# Patient Record
Sex: MALE | Race: OTHER | NOT HISPANIC OR LATINO
[De-identification: names, ages, dates, MRNs, and addresses within clinical notes are randomized per-mention and may not be internally consistent; named-entity substitution may affect disease eponyms.]

---

## 2018-02-26 PROBLEM — Z00.00 ENCOUNTER FOR PREVENTIVE HEALTH EXAMINATION: Status: ACTIVE | Noted: 2018-02-26

## 2018-03-16 ENCOUNTER — APPOINTMENT (OUTPATIENT)
Dept: ORTHOPEDIC SURGERY | Facility: CLINIC | Age: 68
End: 2018-03-16
Payer: COMMERCIAL

## 2018-03-16 VITALS
SYSTOLIC BLOOD PRESSURE: 136 MMHG | HEART RATE: 64 BPM | DIASTOLIC BLOOD PRESSURE: 80 MMHG | HEIGHT: 66 IN | BODY MASS INDEX: 31.34 KG/M2 | WEIGHT: 195 LBS

## 2018-03-16 PROCEDURE — 73502 X-RAY EXAM HIP UNI 2-3 VIEWS: CPT

## 2018-03-16 PROCEDURE — 99203 OFFICE O/P NEW LOW 30 MIN: CPT

## 2018-07-24 ENCOUNTER — OTHER (OUTPATIENT)
Age: 68
End: 2018-07-24

## 2018-08-01 PROBLEM — Z78.9 RARELY CONSUMES ALCOHOL: Status: ACTIVE | Noted: 2018-03-16

## 2018-08-01 PROBLEM — Z87.891 FORMER SMOKER: Status: ACTIVE | Noted: 2018-03-16

## 2018-08-01 PROBLEM — M47.816 LUMBAR SPONDYLOSIS: Status: ACTIVE | Noted: 2018-08-01

## 2018-08-01 PROBLEM — Z78.9 DOES NOT USE ILLICIT DRUGS: Status: ACTIVE | Noted: 2018-03-16

## 2018-08-01 PROBLEM — Z80.9 FAMILY HISTORY OF MALIGNANT NEOPLASM: Status: ACTIVE | Noted: 2018-03-16

## 2018-08-01 PROBLEM — Z86.79 HISTORY OF HYPERTENSION: Status: RESOLVED | Noted: 2018-03-16 | Resolved: 2018-08-01

## 2018-08-01 PROBLEM — Z87.39 HISTORY OF ARTHRITIS: Status: RESOLVED | Noted: 2018-03-16 | Resolved: 2018-08-01

## 2018-08-01 PROBLEM — Z82.61 FAMILY HISTORY OF ARTHRITIS: Status: ACTIVE | Noted: 2018-03-16

## 2018-08-01 RX ORDER — AMLODIPINE BESYLATE 5 MG/1
TABLET ORAL
Refills: 0 | Status: ACTIVE | COMMUNITY

## 2018-08-03 ENCOUNTER — APPOINTMENT (OUTPATIENT)
Dept: ORTHOPEDIC SURGERY | Facility: CLINIC | Age: 68
End: 2018-08-03
Payer: COMMERCIAL

## 2018-08-03 VITALS
BODY MASS INDEX: 31.34 KG/M2 | WEIGHT: 195 LBS | SYSTOLIC BLOOD PRESSURE: 154 MMHG | HEIGHT: 66 IN | DIASTOLIC BLOOD PRESSURE: 72 MMHG | HEART RATE: 56 BPM

## 2018-08-03 DIAGNOSIS — Z80.9 FAMILY HISTORY OF MALIGNANT NEOPLASM, UNSPECIFIED: ICD-10-CM

## 2018-08-03 DIAGNOSIS — Z82.61 FAMILY HISTORY OF ARTHRITIS: ICD-10-CM

## 2018-08-03 DIAGNOSIS — Z86.79 PERSONAL HISTORY OF OTHER DISEASES OF THE CIRCULATORY SYSTEM: ICD-10-CM

## 2018-08-03 DIAGNOSIS — Z87.891 PERSONAL HISTORY OF NICOTINE DEPENDENCE: ICD-10-CM

## 2018-08-03 DIAGNOSIS — Z96.642 PRESENCE OF LEFT ARTIFICIAL HIP JOINT: ICD-10-CM

## 2018-08-03 DIAGNOSIS — Z87.39 PERSONAL HISTORY OF OTHER DISEASES OF THE MUSCULOSKELETAL SYSTEM AND CONNECTIVE TISSUE: ICD-10-CM

## 2018-08-03 DIAGNOSIS — M70.62 TROCHANTERIC BURSITIS, LEFT HIP: ICD-10-CM

## 2018-08-03 DIAGNOSIS — Z78.9 OTHER SPECIFIED HEALTH STATUS: ICD-10-CM

## 2018-08-03 DIAGNOSIS — M47.816 SPONDYLOSIS W/OUT MYELOPATHY OR RADICULOPATHY, LUMBAR REGION: ICD-10-CM

## 2018-08-03 PROCEDURE — 72100 X-RAY EXAM L-S SPINE 2/3 VWS: CPT

## 2018-08-03 PROCEDURE — 99214 OFFICE O/P EST MOD 30 MIN: CPT

## 2018-09-21 ENCOUNTER — APPOINTMENT (OUTPATIENT)
Dept: ORTHOPEDIC SURGERY | Facility: CLINIC | Age: 68
End: 2018-09-21

## 2019-01-16 ENCOUNTER — MESSAGE (OUTPATIENT)
Age: 69
End: 2019-01-16

## 2019-02-24 ENCOUNTER — EMERGENCY (EMERGENCY)
Facility: HOSPITAL | Age: 69
LOS: 1 days | Discharge: HOME | End: 2019-02-24
Attending: EMERGENCY MEDICINE
Payer: COMMERCIAL

## 2019-02-24 VITALS
DIASTOLIC BLOOD PRESSURE: 72 MMHG | HEART RATE: 64 BPM | SYSTOLIC BLOOD PRESSURE: 114 MMHG | OXYGEN SATURATION: 90 % | RESPIRATION RATE: 18 BRPM | TEMPERATURE: 99 F

## 2019-02-24 LAB
ALBUMIN SERPL ELPH-MCNC: 4.3 G/DL — SIGNIFICANT CHANGE UP (ref 3.3–5.2)
ALP SERPL-CCNC: 98 U/L — SIGNIFICANT CHANGE UP (ref 40–120)
ALT FLD-CCNC: 16 U/L — SIGNIFICANT CHANGE UP
ANION GAP SERPL CALC-SCNC: 14 MMOL/L — SIGNIFICANT CHANGE UP (ref 5–17)
APTT BLD: 25.5 SEC — LOW (ref 27.5–36.3)
AST SERPL-CCNC: 14 U/L — SIGNIFICANT CHANGE UP
BASOPHILS # BLD AUTO: 0 K/UL — SIGNIFICANT CHANGE UP (ref 0–0.2)
BASOPHILS NFR BLD AUTO: 0.2 % — SIGNIFICANT CHANGE UP (ref 0–2)
BILIRUB SERPL-MCNC: 0.6 MG/DL — SIGNIFICANT CHANGE UP (ref 0.4–2)
BUN SERPL-MCNC: 18 MG/DL — SIGNIFICANT CHANGE UP (ref 8–20)
CALCIUM SERPL-MCNC: 8.9 MG/DL — SIGNIFICANT CHANGE UP (ref 8.6–10.2)
CHLORIDE SERPL-SCNC: 105 MMOL/L — SIGNIFICANT CHANGE UP (ref 98–107)
CO2 SERPL-SCNC: 24 MMOL/L — SIGNIFICANT CHANGE UP (ref 22–29)
CREAT SERPL-MCNC: 0.99 MG/DL — SIGNIFICANT CHANGE UP (ref 0.5–1.3)
EOSINOPHIL # BLD AUTO: 0.1 K/UL — SIGNIFICANT CHANGE UP (ref 0–0.5)
EOSINOPHIL NFR BLD AUTO: 0.9 % — SIGNIFICANT CHANGE UP (ref 0–5)
GLUCOSE SERPL-MCNC: 162 MG/DL — HIGH (ref 70–115)
HCT VFR BLD CALC: 41.4 % — LOW (ref 42–52)
HGB BLD-MCNC: 13.2 G/DL — LOW (ref 14–18)
INR BLD: 1.21 RATIO — HIGH (ref 0.88–1.16)
LACTATE BLDV-MCNC: 1.7 MMOL/L — SIGNIFICANT CHANGE UP (ref 0.5–2)
LACTATE BLDV-MCNC: 2 MMOL/L — SIGNIFICANT CHANGE UP (ref 0.5–2)
LIDOCAIN IGE QN: 33 U/L — SIGNIFICANT CHANGE UP (ref 22–51)
LYMPHOCYTES # BLD AUTO: 0.7 K/UL — LOW (ref 1–4.8)
LYMPHOCYTES # BLD AUTO: 7.4 % — LOW (ref 20–55)
MAGNESIUM SERPL-MCNC: 1.6 MG/DL — SIGNIFICANT CHANGE UP (ref 1.6–2.6)
MCHC RBC-ENTMCNC: 27.4 PG — SIGNIFICANT CHANGE UP (ref 27–31)
MCHC RBC-ENTMCNC: 31.9 G/DL — LOW (ref 32–36)
MCV RBC AUTO: 86.1 FL — SIGNIFICANT CHANGE UP (ref 80–94)
MONOCYTES # BLD AUTO: 0.4 K/UL — SIGNIFICANT CHANGE UP (ref 0–0.8)
MONOCYTES NFR BLD AUTO: 4.1 % — SIGNIFICANT CHANGE UP (ref 3–10)
NEUTROPHILS # BLD AUTO: 8.5 K/UL — HIGH (ref 1.8–8)
NEUTROPHILS NFR BLD AUTO: 87.1 % — HIGH (ref 37–73)
NT-PROBNP SERPL-SCNC: 55 PG/ML — SIGNIFICANT CHANGE UP (ref 0–300)
PLATELET # BLD AUTO: 171 K/UL — SIGNIFICANT CHANGE UP (ref 150–400)
POTASSIUM SERPL-MCNC: 3.9 MMOL/L — SIGNIFICANT CHANGE UP (ref 3.5–5.3)
POTASSIUM SERPL-SCNC: 3.9 MMOL/L — SIGNIFICANT CHANGE UP (ref 3.5–5.3)
PROT SERPL-MCNC: 7 G/DL — SIGNIFICANT CHANGE UP (ref 6.6–8.7)
PROTHROM AB SERPL-ACNC: 14 SEC — HIGH (ref 10–12.9)
RBC # BLD: 4.81 M/UL — SIGNIFICANT CHANGE UP (ref 4.6–6.2)
RBC # FLD: 14.5 % — SIGNIFICANT CHANGE UP (ref 11–15.6)
SODIUM SERPL-SCNC: 143 MMOL/L — SIGNIFICANT CHANGE UP (ref 135–145)
TROPONIN T SERPL-MCNC: <0.01 NG/ML — SIGNIFICANT CHANGE UP (ref 0–0.06)
TROPONIN T SERPL-MCNC: <0.01 NG/ML — SIGNIFICANT CHANGE UP (ref 0–0.06)
WBC # BLD: 9.7 K/UL — SIGNIFICANT CHANGE UP (ref 4.8–10.8)
WBC # FLD AUTO: 9.7 K/UL — SIGNIFICANT CHANGE UP (ref 4.8–10.8)

## 2019-02-24 PROCEDURE — 83880 ASSAY OF NATRIURETIC PEPTIDE: CPT

## 2019-02-24 PROCEDURE — 71275 CT ANGIOGRAPHY CHEST: CPT | Mod: 26

## 2019-02-24 PROCEDURE — 85730 THROMBOPLASTIN TIME PARTIAL: CPT

## 2019-02-24 PROCEDURE — 99285 EMERGENCY DEPT VISIT HI MDM: CPT | Mod: 25

## 2019-02-24 PROCEDURE — 85610 PROTHROMBIN TIME: CPT

## 2019-02-24 PROCEDURE — 99285 EMERGENCY DEPT VISIT HI MDM: CPT

## 2019-02-24 PROCEDURE — 80053 COMPREHEN METABOLIC PANEL: CPT

## 2019-02-24 PROCEDURE — 71045 X-RAY EXAM CHEST 1 VIEW: CPT | Mod: 26

## 2019-02-24 PROCEDURE — 96375 TX/PRO/DX INJ NEW DRUG ADDON: CPT | Mod: XU

## 2019-02-24 PROCEDURE — 85027 COMPLETE CBC AUTOMATED: CPT

## 2019-02-24 PROCEDURE — 71045 X-RAY EXAM CHEST 1 VIEW: CPT

## 2019-02-24 PROCEDURE — 84484 ASSAY OF TROPONIN QUANT: CPT

## 2019-02-24 PROCEDURE — 36415 COLL VENOUS BLD VENIPUNCTURE: CPT

## 2019-02-24 PROCEDURE — 93010 ELECTROCARDIOGRAM REPORT: CPT

## 2019-02-24 PROCEDURE — 83605 ASSAY OF LACTIC ACID: CPT

## 2019-02-24 PROCEDURE — 96374 THER/PROPH/DIAG INJ IV PUSH: CPT | Mod: XU

## 2019-02-24 PROCEDURE — 83690 ASSAY OF LIPASE: CPT

## 2019-02-24 PROCEDURE — 83735 ASSAY OF MAGNESIUM: CPT

## 2019-02-24 PROCEDURE — 93005 ELECTROCARDIOGRAM TRACING: CPT

## 2019-02-24 PROCEDURE — 71275 CT ANGIOGRAPHY CHEST: CPT

## 2019-02-24 RX ORDER — MAGNESIUM SULFATE 500 MG/ML
2 VIAL (ML) INJECTION ONCE
Qty: 0 | Refills: 0 | Status: COMPLETED | OUTPATIENT
Start: 2019-02-24 | End: 2019-02-24

## 2019-02-24 RX ORDER — ONDANSETRON 8 MG/1
4 TABLET, FILM COATED ORAL ONCE
Qty: 0 | Refills: 0 | Status: COMPLETED | OUTPATIENT
Start: 2019-02-24 | End: 2019-02-24

## 2019-02-24 RX ORDER — AMLODIPINE BESYLATE 2.5 MG/1
1 TABLET ORAL
Qty: 0 | Refills: 0 | COMMUNITY

## 2019-02-24 RX ORDER — SODIUM CHLORIDE 9 MG/ML
1000 INJECTION INTRAMUSCULAR; INTRAVENOUS; SUBCUTANEOUS ONCE
Qty: 0 | Refills: 0 | Status: COMPLETED | OUTPATIENT
Start: 2019-02-24 | End: 2019-02-24

## 2019-02-24 RX ADMIN — SODIUM CHLORIDE 1000 MILLILITER(S): 9 INJECTION INTRAMUSCULAR; INTRAVENOUS; SUBCUTANEOUS at 18:05

## 2019-02-24 RX ADMIN — Medication 50 GRAM(S): at 19:00

## 2019-02-24 RX ADMIN — ONDANSETRON 4 MILLIGRAM(S): 8 TABLET, FILM COATED ORAL at 18:05

## 2019-02-24 NOTE — ED PROVIDER NOTE - NS ED ROS FT
Review of Systems  •	CONSTITUTIONAL - no  fever, no diaphoresis, no weight change  •	SKIN - no rash  •	HEMATOLOGIC - no bleeding, no bruising  •	EYES - no eye pain, no blurred vision  •	ENT - no change in hearing, no pain  •	RESPIRATORY - no shortness of breath, no cough  •	CARDIAC - no chest pain, no palpitations  •	GI - (+) abd pain, (+) nausea, no vomiting, (+) diarrhea, no constipation, no bleeding  •	GENITO-URINARY - no discharge, no dysuria; no hematuria,   •	ENDO - no polydypsia, no polyurea, no heat/no cold intolerance  •	MUSCULOSKELETAL - no joint pain, no swelling, no redness  •	NEUROLOGIC - (+) generalized weakness, no headache, no anesthesia, no paresthesias  •	PSYCH - no anxiety, non suicidal, non homicidal, no hallucination, no depression

## 2019-02-24 NOTE — ED ADULT NURSE NOTE - OBJECTIVE STATEMENT
pt reports he had sudden onset diarrhea at 5am today, then passed out. pt with no SOB, no chest pain, AOX3, even and unlabored rsps present. no vomiting, reports dry heaving upon arrival to ED. sjkin warm dry and intact, no distress.

## 2019-02-24 NOTE — ED ADULT NURSE NOTE - NSIMPLEMENTINTERV_GEN_ALL_ED
Implemented All Universal Safety Interventions:  [place] to call system. Call bell, personal items and telephone within reach. Instruct patient to call for assistance. Room bathroom lighting operational. Non-slip footwear when patient is off stretcher. Physically safe environment: no spills, clutter or unnecessary equipment. Stretcher in lowest position, wheels locked, appropriate side rails in place.

## 2019-02-24 NOTE — ED PROVIDER NOTE - PHYSICAL EXAMINATION
VITAL SIGNS: I have reviewed nursing notes and confirm.  CONSTITUTIONAL: Well-developed; well-nourished; in no acute distress. (+)appears weak   SKIN: Skin exam is warm and dry, no acute rash.  HEAD: Normocephalic; atraumatic.  EYES: 3mm and PERRL, EOM intact; conjunctiva and sclera clear.  ENT: No nasal discharge; airway clear. Throat clear.  NECK: Supple; non tender.  No lymphadenopathy.  CARD: S1, S2 normal; no murmurs, gallops, or rubs. Regular rate and rhythm.  RESP: No wheezes,  no rales or rhonchi.   ABD: Normal bowel sounds; soft; non-distended;  no hepatosplenomegaly. (+) mild diffuse lower abdominal pain   EXT: Normal ROM. No clubbing, cyanosis or edema.  NEURO: Alert, oriented. Grossly unremarkable. No focal deficits. no facial droop, moves all extremities,   PSYCH: Cooperative, appropriate.

## 2019-02-24 NOTE — ED ADULT TRIAGE NOTE - CHIEF COMPLAINT QUOTE
Patient BIBA, diarrhea since 5AM, had a syncopal episode, as per wife patient was out for approx 2 min, patient appears pale, vomiting in ambulance

## 2019-02-24 NOTE — ED PROVIDER NOTE - CLINICAL SUMMARY MEDICAL DECISION MAKING FREE TEXT BOX
68 yo M with multiple diarrhea, now with syncope, likely secondary to dehydration. Will check electrolyte, trop, cxr, ekg, zofran for nausea, IVF for hydration, reassess

## 2019-02-24 NOTE — ED PROVIDER NOTE - PROGRESS NOTE DETAILS
Physical Exam


Date Seen by Provider:  Mar 29, 2018


Time Seen by Provider:  17:56


Narrative


61-year-old male presents to the ED for evaluation of laceration to his left 

leg.  I was asked by my attending to repair laceration.  Please refer to her 

note.





Data


Data


Orders





 Orders


Complete Blood Count With Diff (3/29/18 15:40)


Basic Metabolic Panel (Bmp) (3/29/18 15:40)


Prothrombin Time / Inr (Pt) (3/29/18 15:40)


Act Partial Throm Time (Ptt) (3/29/18 15:40)


Type And Screen (3/29/18 15:40)


Tibia/Fibula (Ap/Lat) (3/29/18 15:49)


Lidocai-Epi 1%-1:100,000 Inj (Xylocaine- (3/29/18 16:00)


Lidocaine Pf 1% Inj (Xylocaine-Mpf 1% In (3/29/18 16:12)


Morphine Inj (Morphine Inj) (3/29/18 17:30)





Labs





Laboratory Tests








Test


  3/29/18


16:00


 


White Blood Count 13.6 TH/MM3 


 


Red Blood Count 3.34 MIL/MM3 


 


Hemoglobin 11.7 GM/DL 


 


Hematocrit 33.5 % 


 


Mean Corpuscular Volume 100.5 FL 


 


Mean Corpuscular Hemoglobin 34.9 PG 


 


Mean Corpuscular Hemoglobin


Concent 34.8 % 


 


 


Red Cell Distribution Width 13.1 % 


 


Platelet Count 315 TH/MM3 


 


Mean Platelet Volume 6.5 FL 


 


Neutrophils (%) (Auto) 81.1 % 


 


Lymphocytes (%) (Auto) 7.3 % 


 


Monocytes (%) (Auto) 10.6 % 


 


Eosinophils (%) (Auto) 0.4 % 


 


Basophils (%) (Auto) 0.6 % 


 


Neutrophils # (Auto) 11.0 TH/MM3 


 


Lymphocytes # (Auto) 1.0 TH/MM3 


 


Monocytes # (Auto) 1.4 TH/MM3 


 


Eosinophils # (Auto) 0.1 TH/MM3 


 


Basophils # (Auto) 0.1 TH/MM3 


 


CBC Comment DIFF FINAL 


 


Differential Comment  


 


Prothrombin Time 10.0 SEC 


 


Prothromb Time International


Ratio 1.0 RATIO 


 


 


Activated Partial


Thromboplast Time 21.9 SEC 


 


 


Blood Urea Nitrogen 6 MG/DL 


 


Creatinine 0.66 MG/DL 


 


Random Glucose 83 MG/DL 


 


Calcium Level 8.0 MG/DL 


 


Sodium Level 130 MEQ/L 


 


Potassium Level 4.2 MEQ/L 


 


Chloride Level 99 MEQ/L 


 


Carbon Dioxide Level 21.6 MEQ/L 


 


Anion Gap 9 MEQ/L 


 


Estimat Glomerular Filtration


Rate 122 ML/MIN 


 











MDM


Medical Record Reviewed:  Yes


Supervised Visit with JOVAN:  No


Procedures


**Procedure Narrative**


LACERATION


LOCATION: left leg


LENGTH: 4 cm


NUMBER OF STITCHES/STAPLES: 4 sutures





REPAIR: The area of the laceration was prepped with Betadine and sterilely 

draped.  The laceration was infiltrated with 1% Xylocaine.  The wound was 

copiously irrigated and explored without evidence of foreign body, tendon 

injury or neurovascular injury.  The wound was closed using 3-0 Prolene. This 

was a 1 layer repair. A sterile dressing was applied. The patient was advised 

to keep the dressing clean and dry. Patient tolerated the procedure well.











Charly Syed Mar 29, 2018 17:57 patient report feeling better, however when he is sleeping he desaturate to 88%, now 97% on 2L O2. Family report that he smoked for over 30 years. Hasn't work up for COPD. He has elevated right hemidiaphram, will get CTA to r/o PE due to hypoxia and syncope. Patient tolerated PO, He is saturating 95% on RA, speaking in full sentences. Family will follow up with a pulmonologist. Will sent a repeat lactate and trop, pending CTA read.

## 2019-02-24 NOTE — ED PROVIDER NOTE - OBJECTIVE STATEMENT
70 yo M hx of HTN p/w syncope and weakness. Patient reported that he had multiple episode of diarrhea started today. This after noon, he was getting up to use the bathroom because he was not felling well, report feeling lightheaded, and syncopized. He was caught by family. No head trauma. He denies chest pain or sob. He started to feel nauseous in the ED.

## 2019-02-26 PROBLEM — I10 ESSENTIAL (PRIMARY) HYPERTENSION: Chronic | Status: ACTIVE | Noted: 2019-02-24

## 2019-03-04 ENCOUNTER — APPOINTMENT (OUTPATIENT)
Dept: PULMONOLOGY | Facility: CLINIC | Age: 69
End: 2019-03-04

## 2020-09-23 NOTE — ED ADULT TRIAGE NOTE - STATUS:
----- Message from Jayla Montgomery sent at 9/23/2020 12:20 PM CDT -----  Regarding: Meds Not a Pharmacy  Type: Needs Medical Advice  Who Called:  Patient  Best Call Back Number: [phone]  Additional Information: patient requesting to speak with office in regards to an infected cut and her pain and antibiotics have not been called into [facility] . Please call back and thank you     [employer]